# Patient Record
Sex: FEMALE | URBAN - METROPOLITAN AREA
[De-identification: names, ages, dates, MRNs, and addresses within clinical notes are randomized per-mention and may not be internally consistent; named-entity substitution may affect disease eponyms.]

---

## 2024-11-08 ENCOUNTER — APPOINTMENT (OUTPATIENT)
Age: 69
Setting detail: DERMATOLOGY
End: 2024-11-09

## 2024-11-08 DIAGNOSIS — L91.0 HYPERTROPHIC SCAR: ICD-10-CM

## 2024-11-08 PROCEDURE — OTHER COUNSELING: OTHER

## 2024-11-08 PROCEDURE — 99202 OFFICE O/P NEW SF 15 MIN: CPT

## 2024-11-08 ASSESSMENT — LOCATION DETAILED DESCRIPTION DERM
LOCATION DETAILED: SUBXIPHOID
LOCATION DETAILED: LEFT ANTERIOR PROXIMAL UPPER ARM

## 2024-11-08 ASSESSMENT — LOCATION SIMPLE DESCRIPTION DERM
LOCATION SIMPLE: LEFT UPPER ARM
LOCATION SIMPLE: ABDOMEN

## 2024-11-08 ASSESSMENT — LOCATION ZONE DERM
LOCATION ZONE: ARM
LOCATION ZONE: TRUNK

## 2024-11-08 NOTE — HPI: EVALUATION OF SKIN LESION(S)
Hpi Title: Evaluation of a Skin Lesion
Additional History: Patient presents for evaluation of lesion on left upper arm.  Raised and firm - present for about a year.  Patient would also like her mastectomy scar evaluated - she states it can be occasionally itchy.

## 2025-03-31 ENCOUNTER — APPOINTMENT (OUTPATIENT)
Age: 70
Setting detail: DERMATOLOGY
End: 2025-03-31

## 2025-03-31 DIAGNOSIS — L91.0 HYPERTROPHIC SCAR: ICD-10-CM

## 2025-03-31 PROCEDURE — OTHER INTRALESIONAL KENALOG: OTHER

## 2025-03-31 PROCEDURE — 11900 INJECT SKIN LESIONS </W 7: CPT

## 2025-03-31 PROCEDURE — OTHER COUNSELING: OTHER

## 2025-03-31 ASSESSMENT — LOCATION DETAILED DESCRIPTION DERM
LOCATION DETAILED: SUBXIPHOID
LOCATION DETAILED: LEFT ANTERIOR PROXIMAL UPPER ARM

## 2025-03-31 ASSESSMENT — LOCATION SIMPLE DESCRIPTION DERM
LOCATION SIMPLE: LEFT UPPER ARM
LOCATION SIMPLE: ABDOMEN

## 2025-03-31 ASSESSMENT — LOCATION ZONE DERM
LOCATION ZONE: ARM
LOCATION ZONE: TRUNK

## 2025-03-31 NOTE — PROCEDURE: INTRALESIONAL KENALOG
Detail Level: Detailed
Bill For Wasted Drug (Kenalog)?: no
Ndc# For Kenalog Only: 2234-5209-62
How Many Mls Were Removed From The 40 Mg/Ml (5ml) Vial When Preparing The Injectable Solution?: 0
Kenalog Preparation: Kenalog
Validate Note Data When Using Inventory: Yes
Total Volume (Ccs): 0.2
Medical Necessity Clause: This procedure was medically necessary because the lesions that were treated were:
Administered By (Optional): AL
Consent: The risks of atrophy were reviewed with the patient.
Kenalog Type Of Vial: Multiple Dose
Lot # For Kenalog (Optional): [id number]
Expiration Date For Kenalog (Optional): 04/27
Show Inventory Tab: Hide
Concentration Of Kenalog Solution Injected (Mg/Ml): 10.0